# Patient Record
Sex: FEMALE | Race: WHITE | Employment: FULL TIME | URBAN - METROPOLITAN AREA
[De-identification: names, ages, dates, MRNs, and addresses within clinical notes are randomized per-mention and may not be internally consistent; named-entity substitution may affect disease eponyms.]

---

## 2017-06-12 ENCOUNTER — OFFICE VISIT (OUTPATIENT)
Dept: INTERNAL MEDICINE CLINIC | Age: 57
End: 2017-06-12

## 2017-06-12 VITALS
BODY MASS INDEX: 26.72 KG/M2 | TEMPERATURE: 98.4 F | DIASTOLIC BLOOD PRESSURE: 70 MMHG | HEART RATE: 67 BPM | OXYGEN SATURATION: 97 % | RESPIRATION RATE: 16 BRPM | HEIGHT: 63 IN | SYSTOLIC BLOOD PRESSURE: 108 MMHG | WEIGHT: 150.8 LBS

## 2017-06-12 DIAGNOSIS — H92.03 EAR PAIN, BILATERAL: Primary | ICD-10-CM

## 2017-06-12 DIAGNOSIS — F41.9 ANXIETY: ICD-10-CM

## 2017-06-12 RX ORDER — BUSPIRONE HYDROCHLORIDE 7.5 MG/1
7.5 TABLET ORAL 3 TIMES DAILY
Qty: 90 TAB | Refills: 2 | Status: SHIPPED | OUTPATIENT
Start: 2017-06-12

## 2017-06-12 NOTE — MR AVS SNAPSHOT
Visit Information Date & Time Provider Department Dept. Phone Encounter #  
 6/12/2017  3:45 PM Owen Kayser, [address] [id number] Follow-up Instructions Return in about 1 month (around 7/12/2017) for follow up anxiety. Upcoming Health Maintenance Date Due Hepatitis C Screening 1960 DTaP/Tdap/Td series (1 - Tdap) 10/30/1983 FOBT Q 1 YEAR AGE 50-75 10/30/2012 INFLUENZA AGE 9 TO ADULT 8/1/2017 BREAST CANCER SCRN MAMMOGRAM 7/15/2018 PAP AKA CERVICAL CYTOLOGY 2/12/2020 Allergies as of 6/12/2017  Review Complete On: 6/12/2017 By: Yolande Cheung No Known Allergies Current Immunizations  Never Reviewed No immunizations on file. Not reviewed this visit You Were Diagnosed With   
  
 Codes Comments Ear pain, bilateral    -  Primary ICD-10-CM: H92.03 
ICD-9-CM: 388.70 Anxiety     ICD-10-CM: F41.9 ICD-9-CM: 300.00 Vitals BP Pulse Temp Resp Height(growth percentile) Weight(growth percentile) 108/70 67 98.4 °F (36.9 °C) (Oral) 16 5' 3\" (1.6 m) 150 lb 12.8 oz (68.4 kg) SpO2 BMI OB Status Smoking Status 97% 26.71 kg/m2 Postmenopausal Never Smoker BMI and BSA Data Body Mass Index Body Surface Area  
 26.71 kg/m 2 1.74 m 2 Preferred Pharmacy Pharmacy Name Phone [facility] [address] [phone] Your Updated Medication List  
  
   
This list is accurate as of: 6/12/17  4:39 PM.  Always use your most recent med list.  
  
  
  
  
 busPIRone 7.5 mg tablet Commonly known as:  BUSPAR Take 1 Tab by mouth three (3) times daily. neomycin-polymyxin-hydrocortisone otic solution Commonly known as:  CORTISPORIN Prescriptions Sent to Pharmacy Refills  
 busPIRone (BUSPAR) 7.5 mg tablet 2 Sig: Take 1 Tab by mouth three (3) times daily.   
 Class: Normal  
 Pharmacy: [id number], [address] [place] [address] #: [phone] Route: Oral  
  
Follow-up Instructions Return in about 1 month (around 7/12/2017) for follow up anxiety. Introducing [facility] & HEALTH SERVICES! Rock Rodriguez introduces Etopus patient portal. Now you can access parts of your medical record, email your doctor's office, and request medication refills online. 1. In your internet browser, go to [email or web address] 2. Click on the First Time User? Click Here link in the Sign In box. You will see the New Member Sign Up page. 3. Enter your Etopus Access Code exactly as it appears below. You will not need to use this code after youve completed the sign-up process. If you do not sign up before the expiration date, you must request a new code. · Etopus Access Code: [email or web address] Expires: 9/10/2017  4:39 PM 
 
4. Enter the last four digits of your Social Security Number (xxxx) and Date of Birth (mm/dd/yyyy) as indicated and click Submit. You will be taken to the next sign-up page. 5. Create a Etopus ID. This will be your Etopus login ID and cannot be changed, so think of one that is secure and easy to remember. 6. Create a Etopus password. You can change your password at any time. 7. Enter your Password Reset Question and Answer. This can be used at a later time if you forget your password. 8. Enter your e-mail address. You will receive e-mail notification when new information is available in [address] 9. Click Sign Up. You can now view and download portions of your medical record. 10. Click the Download Summary menu link to download a portable copy of your medical information. If you have questions, please visit the Frequently Asked Questions section of the Etopus website. Remember, Etopus is NOT to be used for urgent needs. For medical emergencies, dial 911. Now available from your iPhone and Android! Please provide this summary of care documentation to your next provider. Your primary care clinician is listed as Suyapa Mercado. If you have any questions after today's visit, please call [phone].

## 2017-06-12 NOTE — PROGRESS NOTES
SUBJECTIVE:   Ms. Siria Parkinson is a 49 y.o. female who is here for follow up of anxiety. Pt c/o bilateral ear pain x 2-3 days. Pt reports slight pain, itching, and drainage from both ears. Pt reports dry feeling in ears. Pt reports h/o using ear oils to relieve dryness. Pt states itching feeling is \"deeper\" in the ear. Pt endorses having seasonal allergies. Pt endorses using Cortisporin in ears with some relief of itch at night. Pt endorses h/o frequent ear infections. Pt denies fever or chills. Pt c/o increased stress and anxiety. Pt claims feeling as if she is constantly on pins and needles. Pt states her son is back home and has a lot going on, which increases her anxiety. Pt alleviates this anxiety by distracting herself with yardwork such as cutting the grass. Pt denies known allergies to medications. Pt denies depression. Pt states she is open to seeing a psychologist if her insurance will cover it. Pt denies CP or SOB. At this time, she is otherwise doing well and has brought no other complaints to my attention today. For a list of the medical issues addressed today, see the assessment and plan below. PMH: No past medical history on file. PSH:  has a past surgical history that includes tubal ligation. All: has No Known Allergies. MEDS:   Current Outpatient Prescriptions   Medication Sig    busPIRone (BUSPAR) 7.5 mg tablet Take 1 Tab by mouth three (3) times daily.  neomycin-polymyxin-hydrocortisone (CORTISPORIN) otic solution      No current facility-administered medications for this visit. FH: family history includes Alcohol abuse in her sister; Cancer (age of onset: 48) in her sister and sister; Diabetes in her sister; Heart Disease in her sister; Hypertension in her sister. SH:  reports that she has never smoked. She does not have any smokeless tobacco history on file. She reports that she does not drink alcohol.      Review of Systems - History obtained from the patient  General ROS: +stress, otherwise negative  Psychological ROS: +anxiety, otherwise negative  Ophthalmic ROS: negative  ENT ROS: +bilateral ear pain, itching, and drainage, otherwise negative  Respiratory ROS: no cough, shortness of breath, or wheezing  Cardiovascular ROS: no chest pain or dyspnea on exertion  Gastrointestinal ROS: no abdominal pain, change in bowel habits, or black or bloody stools  Genito-Urinary ROS: negative  Musculoskeletal ROS: negative  Neurological ROS: negative  Dermatological ROS: negative    OBJECTIVE:   Vitals:   Visit Vitals    /70    Pulse 67    Temp 98.4 °F (36.9 °C) (Oral)    Resp 16    Ht 5' 3\" (1.6 m)    Wt 150 lb 12.8 oz (68.4 kg)    SpO2 97%    BMI 26.71 kg/m2      Gen: Pleasant 49 y.o.  female in NAD. HEENT: NC/AT. HEAD: No maxillary or frontal sinus tenderness. EYES: PERRLA. EOMI. EARS: thickening of superior portion of Left TM and canals equal bilaterally. No erythema or bulging. OP moist and pink. NARES: Mucosa pink and turbinates normal bilaterally. THROAT: No erythema or exudate. NECK: Supple. No LAD. No thyromegaly. HEART: RRR, No M/G/R. LUNGS: CTAB No W/R. EXTREMITIES: Warm. No C/C/E. NEURO: Alert and oriented x 3. Cranial nerves grossly intact. No focal sensory or motor deficits noted. SKIN: Warm. Dry. No rashes or other lesions noted. ASSESSMENT/ PLAN:   Rosie Schaefer was seen today for anxiety and ear pain. Diagnoses and all orders for this visit:    Ear pain, bilateral    Anxiety  -     busPIRone (BUSPAR) 7.5 mg tablet; Take 1 Tab by mouth three (3) times daily. I prescribed Buspar 7.5mg TID for management of anxiety. Pt was was given names of psychologists for further management of anxiety. I advised pt to use OTC oil or drops to moisturize the ear. Pt will f/u in 1 month for anxiety or sooner if she has a reaction to Buspar. Follow-up Disposition:  Return in about 1 month (around 7/12/2017) for follow up anxiety.     I have reviewed the patient's medications and risks/side effects/benefits were discussed. Diagnosis(-es) explained to patient and questions answered. Literature provided where appropriate.      Written by Cornelio Kagn, as dictated by Garica Valle MD.

## 2017-06-12 NOTE — PROGRESS NOTES
1. Have you been to the ER, urgent care clinic since your last visit? Hospitalized since your last visit? No    2. Have you seen or consulted any other health care providers outside of the [address] [place] [address] since your last visit? Include any pap smears or colon screening.  No

## 2017-07-17 ENCOUNTER — OFFICE VISIT (OUTPATIENT)
Dept: INTERNAL MEDICINE CLINIC | Age: 57
End: 2017-07-17

## 2017-07-17 ENCOUNTER — HOSPITAL ENCOUNTER (OUTPATIENT)
Dept: MAMMOGRAPHY | Age: 57
Discharge: HOME | End: 2017-07-17
Attending: FAMILY MEDICINE
Payer: COMMERCIAL

## 2017-07-17 VITALS
RESPIRATION RATE: 16 BRPM | DIASTOLIC BLOOD PRESSURE: 66 MMHG | SYSTOLIC BLOOD PRESSURE: 107 MMHG | HEART RATE: 70 BPM | TEMPERATURE: 97.9 F | BODY MASS INDEX: 27.82 KG/M2 | OXYGEN SATURATION: 97 % | HEIGHT: 63 IN | WEIGHT: 157 LBS

## 2017-07-17 DIAGNOSIS — F41.9 ANXIETY: Primary | ICD-10-CM

## 2017-07-17 DIAGNOSIS — Z12.31 VISIT FOR SCREENING MAMMOGRAM: ICD-10-CM

## 2017-07-17 PROCEDURE — 77067 SCR MAMMO BI INCL CAD: CPT

## 2017-07-17 NOTE — PROGRESS NOTES
SUBJECTIVE:   Ms. Ronaldo Marc is a 49 y.o. female who is here for follow up of anxiety. Pt reports Buspar helps to \"take the edge off\". Pt endorses taking Buspar TID. Pt reports trouble sleeping after taking evening dose of Buspar. Pt denies use of Wellbutrin XL. Pt reports regular exercise. At this time, she is otherwise doing well and has brought no other complaints to my attention today. For a list of the medical issues addressed today, see the assessment and plan below. PMH:   Past Medical History:   Diagnosis Date    Menopause      PSH:  has a past surgical history that includes tubal ligation. All: has No Known Allergies. MEDS:   Current Outpatient Prescriptions   Medication Sig    Cetirizine (ZYRTEC) 10 mg cap Take  by mouth.  busPIRone (BUSPAR) 7.5 mg tablet Take 1 Tab by mouth three (3) times daily.  neomycin-polymyxin-hydrocortisone (CORTISPORIN) otic solution      No current facility-administered medications for this visit. FH: family history includes Alcohol abuse in her sister; Cancer (age of onset: 48) in her sister and sister; Diabetes in her sister; Heart Disease in her sister; Hypertension in her sister. SH:  reports that she has never smoked. She has never used smokeless tobacco. She reports that she does not drink alcohol.      Review of Systems - History obtained from the patient  General ROS: negative  Psychological ROS: negative  Ophthalmic ROS: negative  ENT ROS: negative  Respiratory ROS: no cough, shortness of breath, or wheezing  Cardiovascular ROS: no chest pain or dyspnea on exertion  Gastrointestinal ROS: no abdominal pain, change in bowel habits, or black or bloody stools  Genito-Urinary ROS: negative  Musculoskeletal ROS: negative  Neurological ROS: negative  Dermatological ROS: negative    OBJECTIVE:   Vitals:   Visit Vitals    /66 (BP 1 Location: Left arm, BP Patient Position: Sitting)    Pulse 70    Temp 97.9 °F (36.6 °C) (Oral)    Resp 16    Ht 5' 3\" (1.6 m)    Wt 157 lb (71.2 kg)    SpO2 97%    BMI 27.81 kg/m2      Gen: Pleasant 49 y.o.  female in NAD. HEENT: NC/AT. HEART: RRR, No M/G/R. LUNGS: CTAB No W/R. EXTREMITIES: Warm. No C/C/E. NEURO: Alert and oriented x 3. Cranial nerves grossly intact. No focal sensory or motor deficits noted. SKIN: Warm. Dry. No rashes or other lesions noted. ASSESSMENT/ PLAN:     Prisca Reyes was seen today for follow-up. Diagnoses and all orders for this visit:    Anxiety     Anxiety controlled with the current dose of Buspar. I advised pt to hold the evening dose of Buspar to see if this improves her ability to fall sleep. Pt will f/u in 3 months for CPE/fasting labs. Follow-up Disposition:  Return in about 3 months (around 10/17/2017) for CPE/fasting labs. I have reviewed the patient's medications and risks/side effects/benefits were discussed. Diagnosis(-es) explained to patient and questions answered. Literature provided where appropriate.      Written by Nely Abdul, as dictated by Ansley Manuel MD.

## 2017-07-17 NOTE — MR AVS SNAPSHOT
Visit Information Date & Time Provider Department Dept. Phone Encounter #  
 7/17/2017 11:45 AM Colby Carpenter, [address] [phone] [id number] Follow-up Instructions Return in about 3 months (around 10/17/2017) for CPE/fasting labs. Upcoming Health Maintenance Date Due Hepatitis C Screening 1960 DTaP/Tdap/Td series (1 - Tdap) 10/30/1983 FOBT Q 1 YEAR AGE 50-75 10/30/2012 INFLUENZA AGE 9 TO ADULT 8/1/2017 BREAST CANCER SCRN MAMMOGRAM 7/15/2018 PAP AKA CERVICAL CYTOLOGY 2/12/2020 Allergies as of 7/17/2017  Review Complete On: 7/17/2017 By: Colby Carpenter MD  
 No Known Allergies Current Immunizations  Never Reviewed No immunizations on file. Not reviewed this visit Vitals BP Pulse Temp Resp Height(growth percentile) Weight(growth percentile) 107/66 (BP 1 Location: Left arm, BP Patient Position: Sitting) 70 97.9 °F (36.6 °C) (Oral) 16 5' 3\" (1.6 m) 157 lb (71.2 kg) SpO2 BMI OB Status Smoking Status 97% 27.81 kg/m2 Postmenopausal Never Smoker Vitals History BMI and BSA Data Body Mass Index Body Surface Area  
 27.81 kg/m 2 1.78 m 2 Preferred Pharmacy Pharmacy Name Phone [facility] [address] [place] [address] Dr Jacobs, [address] [phone] Your Updated Medication List  
  
   
This list is accurate as of: 7/17/17 11:56 AM.  Always use your most recent med list.  
  
  
  
  
 busPIRone 7.5 mg tablet Commonly known as:  BUSPAR Take 1 Tab by mouth three (3) times daily. neomycin-polymyxin-hydrocortisone otic solution Commonly known as:  CORTISPORIN ZyrTEC 10 mg Cap Generic drug:  Cetirizine Take  by mouth. Follow-up Instructions Return in about 3 months (around 10/17/2017) for CPE/fasting labs. Introducing [facility] & HEALTH SERVICES!    
 Mimi Cruz introduces Skydeck patient portal. Now you can access parts of your medical record, email your doctor's office, and request medication refills online. 1. In your internet browser, go to [email or web address] 2. Click on the First Time User? Click Here link in the Sign In box. You will see the New Member Sign Up page. 3. Enter your Mayvenn Access Code exactly as it appears below. You will not need to use this code after youve completed the sign-up process. If you do not sign up before the expiration date, you must request a new code. · Mayvenn Access Code: [email or web address] Expires: 9/10/2017  4:39 PM 
 
4. Enter the last four digits of your Social Security Number (xxxx) and Date of Birth (mm/dd/yyyy) as indicated and click Submit. You will be taken to the next sign-up page. 5. Create a Mayvenn ID. This will be your Mayvenn login ID and cannot be changed, so think of one that is secure and easy to remember. 6. Create a Mayvenn password. You can change your password at any time. 7. Enter your Password Reset Question and Answer. This can be used at a later time if you forget your password. 8. Enter your e-mail address. You will receive e-mail notification when new information is available in [address] 9. Click Sign Up. You can now view and download portions of your medical record. 10. Click the Download Summary menu link to download a portable copy of your medical information. If you have questions, please visit the Frequently Asked Questions section of the Mayvenn website. Remember, Mayvenn is NOT to be used for urgent needs. For medical emergencies, dial 911. Now available from your iPhone and Android! Please provide this summary of care documentation to your next provider. Your primary care clinician is listed as Dru Garrett. If you have any questions after today's visit, please call [phone].

## 2017-12-11 ENCOUNTER — OFFICE VISIT (OUTPATIENT)
Dept: INTERNAL MEDICINE CLINIC | Age: 57
End: 2017-12-11

## 2017-12-11 VITALS
OXYGEN SATURATION: 95 % | DIASTOLIC BLOOD PRESSURE: 71 MMHG | TEMPERATURE: 97.9 F | WEIGHT: 159.8 LBS | SYSTOLIC BLOOD PRESSURE: 110 MMHG | RESPIRATION RATE: 16 BRPM | BODY MASS INDEX: 28.31 KG/M2 | HEIGHT: 63 IN | HEART RATE: 77 BPM

## 2017-12-11 DIAGNOSIS — Z00.00 ANNUAL PHYSICAL EXAM: Primary | ICD-10-CM

## 2017-12-11 NOTE — MR AVS SNAPSHOT
Visit Information Date & Time Provider Department Dept. Phone Encounter #  
 12/11/2017  3:00 PM Pearl Gilliland, [address] [address] [phone] [id number] Follow-up Instructions Return in about 1 year (around 12/11/2018) for CPE/fasting labs. Your Appointments 6/11/2018 10:00 AM  
ROUTINE CARE with Pearl Gilliland MD  
[facility] [address]) Appt Note: 6 month follow up  
 [facility] [address] [address]  
[address] [facility] [address] [place] [address] Upcoming Health Maintenance Date Due Hepatitis C Screening 1960 DTaP/Tdap/Td series (1 - Tdap) 10/30/1983 FOBT Q 1 YEAR AGE 50-75 10/30/2012 BREAST CANCER SCRN MAMMOGRAM 7/17/2019 PAP AKA CERVICAL CYTOLOGY 2/12/2020 Allergies as of 12/11/2017  Review Complete On: 12/11/2017 By: Pearl Gilliland MD  
 No Known Allergies Current Immunizations  Never Reviewed No immunizations on file. Not reviewed this visit You Were Diagnosed With   
  
 Codes Comments Annual physical exam    -  Primary ICD-10-CM: Z00.00 ICD-9-CM: V70.0 Vitals BP Pulse Temp Resp Height(growth percentile) Weight(growth percentile) 110/71 (BP 1 Location: Left arm, BP Patient Position: Sitting) 77 97.9 °F (36.6 °C) (Oral) 16 5' 3\" (1.6 m) 159 lb 12.8 oz (72.5 kg) SpO2 BMI OB Status Smoking Status 95% 28.31 kg/m2 Postmenopausal Never Smoker Vitals History BMI and BSA Data Body Mass Index Body Surface Area  
 28.31 kg/m 2 1.8 m 2 Preferred Pharmacy Pharmacy Name Phone [facility] [address], [facility] [address] [phone] Your Updated Medication List  
  
   
This list is accurate as of: 12/11/17  4:30 PM.  Always use your most recent med list.  
  
  
  
  
 busPIRone 7.5 mg tablet Commonly known as:  BUSPAR  
 Take 1 Tab by mouth three (3) times daily. neomycin-polymyxin-hydrocortisone otic solution Commonly known as:  CORTISPORIN  
3-4 Drops by Otic route as needed. ZyrTEC 10 mg Cap Generic drug:  Cetirizine Take 10 mg by mouth daily. We Performed the Following AMB POC URINALYSIS DIP STICK AUTO W/O MICRO [86804 CPT(R)] CBC WITH AUTOMATED DIFF [67684 CPT(R)] HEMOGLOBIN A1C WITH EAG [01724 CPT(R)] HEPATITIS C AB [66638 CPT(R)] LIPID PANEL [48306 CPT(R)] METABOLIC PANEL, COMPREHENSIVE [90295 CPT(R)] T4, FREE [id number] CPT(R)] TSH 3RD GENERATION [99029 CPT(R)] Follow-up Instructions Return in about 1 year (around 12/11/2018) for CPE/fasting labs. To-Do List   
 12/11/2017 PFT:  PULMONARY FUNCTION TEST Introducing [facility] & HEALTH SERVICES! [facility] introduces [employer] patient portal. Now you can access parts of your medical record, email your doctor's office, and request medication refills online. 1. In your internet browser, go to [email or web address] 2. Click on the First Time User? Click Here link in the Sign In box. You will see the New Member Sign Up page. 3. Enter your [employer] Access Code exactly as it appears below. You will not need to use this code after youve completed the sign-up process. If you do not sign up before the expiration date, you must request a new code. · [employer] Access Code: [email or web address] Expires: 3/11/2018  4:22 PM 
 
4. Enter the last four digits of your Social Security Number (xxxx) and Date of Birth (mm/dd/yyyy) as indicated and click Submit. You will be taken to the next sign-up page. 5. Create a Keyweet ID. This will be your Keyweet login ID and cannot be changed, so think of one that is secure and easy to remember. 6. Create a [employer] password. You can change your password at any time. 7. Enter your Password Reset Question and Answer.  This can be used at a later time if you forget your password. 8. Enter your e-mail address. You will receive e-mail notification when new information is available in [address] 9. Click Sign Up. You can now view and download portions of your medical record. 10. Click the Download Summary menu link to download a portable copy of your medical information. If you have questions, please visit the Frequently Asked Questions section of the VendorStack website. Remember, VendorStack is NOT to be used for urgent needs. For medical emergencies, dial 911. Now available from your iPhone and Android! Please provide this summary of care documentation to your next provider. Your primary care clinician is listed as Marco Antonio Bland. If you have any questions after today's visit, please call [phone].

## 2017-12-11 NOTE — PROGRESS NOTES
SUBJECTIVE:   Ernestina Fernandez is a 56 y.o. female who is here for complete physical exam without PAP. She reports a recent decrease in energy. She gets frequent exercise, but noticed now she gets tired more quickly and has SOB on exertion. She denies any CP, wheezing, or recent cigarette smoke exposure (had exposure in past). She reports there is a a new born in her household which may be affecting her sleep. She reports she gets heartburn after eating certain foods. PREVENTIVE:  Colonoscopy: current (at [facility])  Pap: current - 7/17/2017  Mammogram: current    Flu: current - October 2017   Hep C screen: due       Pt specifically denies changes in vision or hearing, trouble with swallowing or taste, CP, upset stomach, change in bowel habits, problems urinating, unusual joint or muscle pains, numbness or tingling in extremities, or skin lesions of concern. At this time, she is otherwise doing well and has brought no other complaints to my attention today. For a list of the medical issues addressed today, see the assessment and plan below. PMH:   Past Medical History:   Diagnosis Date    Menopause        PSH:  has a past surgical history that includes tubal ligation. Allergies: has No Known Allergies. Meds:   Current Outpatient Prescriptions   Medication Sig    Cetirizine (ZYRTEC) 10 mg cap Take 10 mg by mouth daily.  neomycin-polymyxin-hydrocortisone (CORTISPORIN) otic solution 3-4 Drops by Otic route as needed.  busPIRone (BUSPAR) 7.5 mg tablet Take 1 Tab by mouth three (3) times daily. No current facility-administered medications for this visit. Fam hx: family history includes Alcohol abuse in her sister; Cancer (age of onset: 48) in her sister and sister; Diabetes in her sister; Heart Disease in her sister; Hypertension in her sister. Soc hx:  reports that she has never smoked.  She has never used smokeless tobacco. She reports that she does not drink alcohol or use illicit drugs.      Review of Systems - History obtained from the patient  General ROS: negative  Psychological ROS: negative  Ophthalmic ROS: negative  ENT ROS: negative  Respiratory ROS: SOB with exertion no cough, or wheezing  Cardiovascular ROS: no chest pain or dyspnea on exertion  Gastrointestinal ROS: occasional heartburn after eating acid foods no abdominal pain, change in bowel habits, or black or bloody stools  Genito-Urinary ROS: negative  Musculoskeletal ROS: negative  Neurological ROS: negative  Dermatological ROS: negative    OBJECTIVE:   Vitals:   Visit Vitals    /71 (BP 1 Location: Left arm, BP Patient Position: Sitting)    Pulse 77    Temp 97.9 °F (36.6 °C) (Oral)    Resp 16    Ht 5' 3\" (1.6 m)    Wt 159 lb 12.8 oz (72.5 kg)    SpO2 95%    BMI 28.31 kg/m2     Gen: Pleasant 56 y.o. female in NAD. HEENT: PERRLA. EOMI. OP moist and pink. EARS: TMs normal and canals equal bilaterally. NECK: Supple. No LAD. No thyromegaly. HEART: RRR, No M/G/R.     LUNGS: CTAB No W/R. ABDOMEN: S, NT, ND, BS+. EXTREMITIES: Warm. No C/C/E.    MUSCULOSKELETAL: Normal ROM, muscle strength 5/5 all groups. NEURO: Alert and oriented x 3. Cranial nerves grossly intact. No focal sensory or motor deficits noted. SKIN: Warm. Dry. No rashes or other lesions noted. Female : normal external genitalia, no discharge, no lesions, no masses    ASSESSMENT/ PLAN:     Diagnoses and all orders for this visit:    1. Annual physical exam  -     AMB POC URINALYSIS DIP STICK AUTO W/O MICRO  -     HEPATITIS C AB  -     LIPID PANEL  -     METABOLIC PANEL, COMPREHENSIVE  -     CBC WITH AUTOMATED DIFF  -     T4, FREE  -     TSH 3RD GENERATION  -     HEMOGLOBIN A1C WITH EAG  -     PULMONARY FUNCTION TEST; Future      1. Annual Physical Exam   Severo Spaniel is overall in good health. Chuck Fermin physical exam was normal and urinalysis was clear.  Pt was given lab orders for HEP C, FLP, CMP, CBC, T4, TSH, HgA1c to have done when she is fasting. I also ordered a pulmonary function test to further assess her SOB and decreased energy. Follow-up Disposition:  Return in about 1 year (around 2018) for CPE/fasting labs. I have reviewed the patient's medications and risks/side effects/benefits were discussed. Diagnosis(-es) explained to patient and questions answered. Literature provided where appropriate.      Written by Alda Cruz, as dictated by Gordon Garcia MD.

## 2019-04-05 ENCOUNTER — HOSPITAL ENCOUNTER (OUTPATIENT)
Dept: MAMMOGRAPHY | Age: 59
Discharge: HOME | End: 2019-04-05
Attending: FAMILY MEDICINE
Payer: MEDICAID

## 2019-04-05 DIAGNOSIS — Z12.39 BREAST SCREENING, UNSPECIFIED: ICD-10-CM

## 2019-04-05 PROCEDURE — 77067 SCR MAMMO BI INCL CAD: CPT

## 2019-07-19 ENCOUNTER — TELEPHONE (OUTPATIENT)
Dept: INTERNAL MEDICINE CLINIC | Age: 59
End: 2019-07-19

## 2019-07-19 NOTE — TELEPHONE ENCOUNTER
Pt needs telephone number to make an appt for a colonoscopy.        Message received & copied from [facility]

## 2019-07-22 NOTE — TELEPHONE ENCOUNTER
Identified patient 2 identifiers verified.   Patient  Was given contact number to GI specialist [phone]

## 2023-03-08 ENCOUNTER — APPOINTMENT (RX ONLY)
Dept: URBAN - NONMETROPOLITAN AREA CLINIC 26 | Facility: CLINIC | Age: 63
Setting detail: DERMATOLOGY
End: 2023-03-08

## 2023-03-08 DIAGNOSIS — B07.8 OTHER VIRAL WARTS: ICD-10-CM

## 2023-03-08 DIAGNOSIS — D22 MELANOCYTIC NEVI: ICD-10-CM

## 2023-03-08 DIAGNOSIS — L57.8 OTHER SKIN CHANGES DUE TO CHRONIC EXPOSURE TO NONIONIZING RADIATION: ICD-10-CM

## 2023-03-08 DIAGNOSIS — L81.4 OTHER MELANIN HYPERPIGMENTATION: ICD-10-CM

## 2023-03-08 PROBLEM — D22.5 MELANOCYTIC NEVI OF TRUNK: Status: ACTIVE | Noted: 2023-03-08

## 2023-03-08 PROCEDURE — ? COUNSELING

## 2023-03-08 PROCEDURE — 99203 OFFICE O/P NEW LOW 30 MIN: CPT

## 2023-03-08 ASSESSMENT — LOCATION DETAILED DESCRIPTION DERM
LOCATION DETAILED: LEFT DISTAL POSTERIOR UPPER ARM
LOCATION DETAILED: LEFT MEDIAL UPPER BACK
LOCATION DETAILED: RIGHT DISTAL POSTERIOR UPPER ARM
LOCATION DETAILED: LEFT SUPERIOR MEDIAL LOWER BACK
LOCATION DETAILED: LEFT MEDIAL MALAR CHEEK
LOCATION DETAILED: RIGHT ANTERIOR PROXIMAL THIGH

## 2023-03-08 ASSESSMENT — LOCATION SIMPLE DESCRIPTION DERM
LOCATION SIMPLE: LEFT POSTERIOR UPPER ARM
LOCATION SIMPLE: LEFT LOWER BACK
LOCATION SIMPLE: RIGHT THIGH
LOCATION SIMPLE: LEFT UPPER BACK
LOCATION SIMPLE: RIGHT POSTERIOR UPPER ARM
LOCATION SIMPLE: LEFT CHEEK

## 2023-03-08 ASSESSMENT — LOCATION ZONE DERM
LOCATION ZONE: ARM
LOCATION ZONE: LEG
LOCATION ZONE: FACE
LOCATION ZONE: TRUNK